# Patient Record
Sex: FEMALE | Race: WHITE | URBAN - METROPOLITAN AREA
[De-identification: names, ages, dates, MRNs, and addresses within clinical notes are randomized per-mention and may not be internally consistent; named-entity substitution may affect disease eponyms.]

---

## 2024-07-02 ENCOUNTER — HOSPITAL ENCOUNTER (OUTPATIENT)
Dept: PHYSICAL THERAPY | Age: 34
Setting detail: RECURRING SERIES
Discharge: HOME | End: 2024-07-05
Payer: MEDICAID

## 2024-07-02 DIAGNOSIS — M79.661 PAIN IN BOTH LOWER LEGS: ICD-10-CM

## 2024-07-02 DIAGNOSIS — I89.0 LYMPHEDEMA, NOT ELSEWHERE CLASSIFIED: ICD-10-CM

## 2024-07-02 DIAGNOSIS — R60.9 LIPEDEMA: Primary | ICD-10-CM

## 2024-07-02 DIAGNOSIS — M79.662 PAIN IN BOTH LOWER LEGS: ICD-10-CM

## 2024-07-02 PROCEDURE — 97166 OT EVAL MOD COMPLEX 45 MIN: CPT

## 2024-07-02 ASSESSMENT — PAIN SCALES - GENERAL: PAINLEVEL_OUTOF10: 2

## 2024-07-02 NOTE — PROGRESS NOTES
Jeferson Colvin  : 1990  Primary: [employer] Med[employer]* (Medicaid Managed)  Secondary:  [facility]  [address] [place] [address]  Phone: [phone]  Fax: [phone]    Plan of Care/Certification Expiration Date:  Certification Period Expiration Date: 24      Frequency/Duration: Plan Frequency: twice a week up to 90 days      Time In/Out:   Time In: 0815  Time Out: 0845    OT Visit Info:  Plan Frequency: twice a week up to 90 days  Total # of Visits Approved: 75  Total # of Visits to Date: 1       Visit Count: Visit count could not be calculated. Make sure you are using a visit which is associated with an episode.   OUTPATIENT OCCUPATIONAL THERAPY: Treatment Note 2024  Episode: (Lipedema)         Treatment Diagnosis:    Lipedema  Lymphedema, not elsewhere classified  Pain in both lower legs  Medical/Referring Diagnosis:   Localized edema   Referring Physician:  Jeferson Kidd PA-C MD Orders:  OT Eval and Treat   Return MD Appt:  TBD   Date of Onset:  Onset Date: 90     Allergies:  Patient has no allergy information on record.  Restrictions/Precautions:   None      Medications Last Reviewed:  2024     Interventions Planned (Treatment may consist of any combination of the following):     See Assessment Note      Subjective Comments:   \"Help mobility, reduce pain/inflammation\"   >Initial Pain Level:     2/10  >Post Session Pain Level:     2/10  Medications Last Reviewed:  2024  Updated Objective Findings:  See Evaluation Note from today  Treatment   MANUAL THERAPY: ( minutes):   Manual lymphatic drainage was utilized and necessary because of the patient's  lipolymphedema .   Manual Lymph Drainage:   Lymph Nodes:    Cervical Supraclavicular Axillary Abdominal Inguinal Popliteal Antecubital   RIGHT []     []     []     []     []     []     []       LEFT []     []     []     []     []     []     []

## 2024-07-02 NOTE — THERAPY EVALUATION
Jeferson Zunigacoat  : 1990  Primary: [employer] Healthplan Medica* (Medicaid Managed)  Secondary:  [facility]  [address] [place] [address]  Phone: [phone]  Fax: [phone] Plan Frequency: twice a week up to 90 days    Certification Period Expiration Date: 24        Plan of Care/Certification Expiration Date:  Certification Period Expiration Date: 24      Frequency/Duration: Plan Frequency: twice a week up to 90 days      Time In/Out:   Time In: 0815  Time Out: 0845    OT Visit Info:  Plan Frequency: twice a week up to 90 days  Total # of Visits Approved: 75  Total # of Visits to Date: 1       Visit Count: Visit count could not be calculated. Make sure you are using a visit which is associated with an episode.   OUTPATIENT OCCUPATIONAL THERAPY:Initial Assessment 2024  Episode: (Lipedema)           Treatment Diagnosis:    Lipedema  Lymphedema, not elsewhere classified  Pain in both lower legs  Medical/Referring Diagnosis:    Localized edema   Referring Physician:  Jeferson Kidd PA-C MD Orders:  OT Eval and Treat   Return MD Appt:  TBD  Date of Onset:  Onset Date: 90     Allergies:  Patient has no allergy information on record.  Restrictions/Precautions:    None      Medications Last Reviewed:  2024     SUBJECTIVE   History of Injury/Illness (Reason for Referral):  2  Patient Stated Goal(s):  \"Help mobility; reduce pain and inflammation\"  Initial Pain Assessment:     2/10    Post Session Pain Level:     2/10  Past Medical History/Comorbidities:   Ms. Colvin  has no past medical history on file.  Ms. Colvin  has no past surgical history on file.  Social History/Living Environment:   Patient lives with their family     Level of Function/Work/Activity:    Prior Level of Function: Independent         Learning:   Does the patient/guardian have any barriers to learning?: No barriers    Fall Risk Scale:   Gutierrez Total

## 2024-07-11 ENCOUNTER — HOSPITAL ENCOUNTER (OUTPATIENT)
Dept: PHYSICAL THERAPY | Age: 34
Setting detail: RECURRING SERIES
Discharge: HOME | End: 2024-07-14
Payer: MEDICAID

## 2024-07-11 DIAGNOSIS — R60.9 LYMPHEDEMA DUE TO LIPEDEMA: Primary | ICD-10-CM

## 2024-07-11 DIAGNOSIS — I89.0 LYMPHEDEMA DUE TO LIPEDEMA: Primary | ICD-10-CM

## 2024-07-11 PROCEDURE — 97535 SELF CARE MNGMENT TRAINING: CPT

## 2024-07-11 PROCEDURE — 97140 MANUAL THERAPY 1/> REGIONS: CPT

## 2024-07-11 NOTE — PROGRESS NOTES
RIGHT []     []     []     []       LEFT []     []     []     []         Limbs:   []    RUE     []    LUE     [x]    RLE    [x]    LLE    SELF CARE: ( 15 minutes):   Reduction wrap applied to BLE. Aquaphor for skin care. Cotton liner. Artiflex around ankles. 3 bandages each leg. Wear for 24 hours if able, remove and document results.       Treatment/Session Summary:    Treatment Assessment:   Pt in agreement with POC and goals   Communication/Consultation:  None today  Equipment provided today:  None  Recommendations/Intent for next treatment session: Next visit will focus on phase 1 complete decongestive therapy and patient education.    >Total Treatment Billable Duration:  OT Individual Minutes  Time In: 1430  Time Out: 1530  Minutes: 60    Leandro Alegre OT     Charge Capture   Events  [place] Portal  Appt Desk  Attendance Report     Future Appointments   Date Time Provider Department Center   7/18/2024  1:30 PM Leandro Alegre OT [facility]   7/22/2024 12:30 PM Leandro Alegre OT [facility]   7/25/2024 12:30 PM Leandro Alegre OT [facility]   7/30/2024 11:00 AM Leandro Alegre OT [facility]   8/1/2024  1:30 PM Leandro Alegre OT [facility]

## 2024-07-18 ENCOUNTER — HOSPITAL ENCOUNTER (OUTPATIENT)
Dept: PHYSICAL THERAPY | Age: 34
Setting detail: RECURRING SERIES
Discharge: HOME | End: 2024-07-21
Payer: MEDICAID

## 2024-07-18 PROCEDURE — 97140 MANUAL THERAPY 1/> REGIONS: CPT

## 2024-07-18 PROCEDURE — 97535 SELF CARE MNGMENT TRAINING: CPT

## 2024-07-18 NOTE — PROGRESS NOTES
Jeferson Colvin  : 1990  Primary: [employer] Healthplan Medica* (Medicaid Managed)  Secondary:  [facility] [place] @ [facility]  [address] [place] [address]  Phone: [phone]  Fax: [phone]    Plan of Care/Certification Expiration Date:  Certification Period Expiration Date: 24      Frequency/Duration: Plan Frequency: twice a week up to 90 days      Time In/Out:   Time In: 1330  Time Out: 1430    OT Visit Info:  Plan Frequency: twice a week up to 90 days  Total # of Visits Approved: 75  Total # of Visits to Date: 3          OUTPATIENT OCCUPATIONAL THERAPY: Treatment Note 2024  Episode: (Lipedema)         Treatment Diagnosis:    No data found  Medical/Referring Diagnosis:   Localized edema   Referring Physician:  Jeferson Kidd PA-C MD Orders:  OT Eval and Treat   Return MD Appt:  TBD   Date of Onset:  Onset Date: 90     Allergies:  Patient has no allergy information on record.  Restrictions/Precautions:   None      Medications Last Reviewed:  2024     Interventions Planned (Treatment may consist of any combination of the following):     See Assessment Note      Subjective Comments:   \"Help mobility, reduce pain/inflammation\"   >Initial Pain Level:      /10  >Post Session Pain Level:      /10  Medications Last Reviewed:  2024  Updated Objective Findings:  See Evaluation Note from today  Treatment   MANUAL THERAPY: ( 45 minutes):   Manual lymphatic drainage was utilized and necessary because of the patient's  lipolymphedema .   Manual Lymph Drainage:   Lymph Nodes:    Cervical Supraclavicular Axillary Abdominal Inguinal Popliteal Antecubital   RIGHT [x]     []     [x]     []     [x]     [x]     []       LEFT [x]     []     [x]     []     [x]     [x]     []         Anastamoses:   Axillo-axillary Inguino-inguinal Axillo-inguinal Inguino-axillary   ANTERIOR []     []     []     []       POSTERIOR []     []     []     []       RIGHT []

## 2024-07-22 ENCOUNTER — HOSPITAL ENCOUNTER (OUTPATIENT)
Dept: PHYSICAL THERAPY | Age: 34
Setting detail: RECURRING SERIES
Discharge: HOME | End: 2024-07-25
Payer: MEDICAID

## 2024-07-22 PROCEDURE — 97140 MANUAL THERAPY 1/> REGIONS: CPT

## 2024-07-22 PROCEDURE — 97535 SELF CARE MNGMENT TRAINING: CPT

## 2024-07-22 NOTE — PROGRESS NOTES
LEFT []     []     []     []         Limbs:   []    RUE     []    LUE     [x]    RLE    [x]    LLE    SELF CARE: ( 15 minutes):   Reduction wrap applied to BLE. Aquaphor for skin care. Cotton liner. Artiflex around ankles. 3 bandages each leg. .     Edema/Girth   PRETREATMENT AFFECTED LIMB(s): LOWER EXTREMITY       Date:   7/2/24           Right / Left              Groin    []      []              8 inches    []      []              4 inches    [x]      [x] 55.5/55  54  53       PoplitealSpace    [x]      [x] 48/47.5  43  45          8 inches    [x]      [x] 53/54  52  53          4 inches    [x]      [x] 38/41  42  41       30 down    [x]      [x]  31  30.5        Ankle    [x]      [x] 28/27  26  26.5          Instep    [x]      [x] 23.5/23.5  23.5  22       Measurements are taken in centimeters:  2.54 cm = 1 inch   Cumulative circumferential volumetric graph measurements  RLE total size 236 cm         LLE total size 248 cm                Treatment/Session Summary:    Treatment Assessment:   Really good initial reductions, now bilaterally bandaging from foot to inferior knee.   Need to schedule past 8/1/24 (patient did after session).   Did comfort Care MD benefit check 7/18/24 - not in network. Reached out to [facility] for benefit check on 7/24/24.   Consider Rejuva leggings, Sigvaris leggings, or Juzo leggings.   Communication/Consultation:  None today  Equipment provided today:  None  Recommendations/Intent for next treatment session: Next visit will focus on phase 1 complete decongestive therapy and patient education.    >Total Treatment Billable Duration:       Leandro Alegre OT     Charge Capture   Events  [employer] Portal  Appt Desk  Attendance Report     Future Appointments   Date Time Provider Department Center   7/25/2024 12:30 PM Leandro Alegre OT [facility]   7/30/2024 11:00 AM Leandro Alegre OT [facility]   8/1/2024  1:30 PM Leandro Alegre OT [facility]   8/7/2024 12:30 PM Chely Rogers

## 2024-07-25 ENCOUNTER — HOSPITAL ENCOUNTER (OUTPATIENT)
Dept: PHYSICAL THERAPY | Age: 34
Setting detail: RECURRING SERIES
Discharge: HOME | End: 2024-07-28
Payer: MEDICAID

## 2024-07-25 DIAGNOSIS — I89.0 LYMPHEDEMA: Primary | ICD-10-CM

## 2024-07-25 PROCEDURE — 97140 MANUAL THERAPY 1/> REGIONS: CPT

## 2024-07-25 PROCEDURE — 97535 SELF CARE MNGMENT TRAINING: CPT

## 2024-07-26 NOTE — PROGRESS NOTES
Jeferson Colvin  : 1990  Primary: [employer] Healthplan Medica* (Medicaid Managed)  Secondary:  [place] [place] @ [facility]  [address] [place] [address]  Phone: [phone]  Fax: [phone]    Plan of Care/Certification Expiration Date:  Certification Period Expiration Date: 24      Frequency/Duration: Plan Frequency: twice a week up to 90 days      Time In/Out:   Time In: 1230  Time Out: 1330    OT Visit Info:  Plan Frequency: twice a week up to 90 days  Total # of Visits Approved: 75  Total # of Visits to Date: 5          OUTPATIENT OCCUPATIONAL THERAPY: Treatment Note 2024  Episode: (Lipedema)         Treatment Diagnosis:    Lymphedema  Medical/Referring Diagnosis:   Localized edema   Referring Physician:  Jeferson Kidd PA-C MD Orders:  OT Eval and Treat   Return MD Appt:  TBD   Date of Onset:  Onset Date: 90     Allergies:  Patient has no allergy information on record.  Restrictions/Precautions:   None      Medications Last Reviewed:  2024     Interventions Planned (Treatment may consist of any combination of the following):     See Assessment Note      Subjective Comments:   \"Help mobility, reduce pain/inflammation\"   >Initial Pain Level:      /10  >Post Session Pain Level:      /10  Medications Last Reviewed:  2024  Updated Objective Findings:  None Today  Treatment   MANUAL THERAPY: ( 45 minutes):   Manual lymphatic drainage was utilized and necessary because of the patient's  lipolymphedema .   Manual Lymph Drainage:   Lymph Nodes:    Cervical Supraclavicular Axillary Abdominal Inguinal Popliteal Antecubital   RIGHT [x]     []     [x]     []     [x]     [x]     []       LEFT [x]     []     [x]     []     [x]     [x]     []         Anastamoses:   Axillo-axillary Inguino-inguinal Axillo-inguinal Inguino-axillary   ANTERIOR []     []     []     []       POSTERIOR []     []     []     []       RIGHT []     []     []     []

## 2024-07-30 ENCOUNTER — APPOINTMENT (OUTPATIENT)
Dept: PHYSICAL THERAPY | Age: 34
End: 2024-07-30
Payer: MEDICAID

## 2024-08-01 ENCOUNTER — HOSPITAL ENCOUNTER (OUTPATIENT)
Dept: PHYSICAL THERAPY | Age: 34
Setting detail: RECURRING SERIES
Discharge: HOME | End: 2024-08-04
Payer: MEDICAID

## 2024-08-01 PROCEDURE — 97535 SELF CARE MNGMENT TRAINING: CPT

## 2024-08-01 PROCEDURE — 97140 MANUAL THERAPY 1/> REGIONS: CPT

## 2024-08-03 NOTE — PROGRESS NOTES
LEFT []     []     []     []         Limbs:   []    RUE     []    LUE     [x]    RLE    [x]    LLE    SELF CARE: ( 25 minutes):   Reduction wrap applied to BLE. Aquaphor for skin care. Cotton liner. Artiflex around ankles. 3 bandages each leg. Patient continues to have good results.     Edema/Girth   PRETREATMENT AFFECTED LIMB(s): LOWER EXTREMITY       Date:   7/2/24 7/25          Right / Left              Groin    []      []              8 inches    []      []              4 inches    [x]      [x] 55.5/55  54  53       PoplitealSpace    [x]      [x] 48/47.5  43  45          8 inches    [x]      [x] 53/54  52  53          4 inches    [x]      [x] 38/41  42  41       30 down    [x]      [x]  31  30.5        Ankle    [x]      [x] 28/27  26  26.5          Instep    [x]      [x] 23.5/23.5  23.5  22       Measurements are taken in centimeters:  2.54 cm = 1 inch   Cumulative circumferential volumetric graph measurements  RLE total size 236 cm         LLE total size 248 cm              Treatment/Session Summary:    Treatment Assessment:   Bilaterally bandaging from foot to inferior knee. Patient noticed after missing Tuesday appointment yesterday her fluid was larger than she would have wished.       Need to schedule past 8/1/24 (patient did after session).   Did comfort Care MD benefit check 7/18/24 - not in network. Reached out to [facility] for benefit check on 7/24/24.   Consider Rejuva leggings, Sigvaris leggings, or Juzo leggings.   Communication/Consultation:  None today  Equipment provided today:  None  Recommendations/Intent for next treatment session: Next visit will focus on phase 1 complete decongestive therapy and patient education.    >Total Treatment Billable Duration:  OT Individual Minutes  Time In: 1330  Time Out: 1430  Minutes: 60    Leandro Alegre OT     Charge Capture   Events  [employer] Portal  Appt Desk  Attendance Report     Future Appointments   Date Time Provider Department Center   8/7/2024

## 2024-08-07 ENCOUNTER — HOSPITAL ENCOUNTER (OUTPATIENT)
Dept: PHYSICAL THERAPY | Age: 34
Setting detail: RECURRING SERIES
Discharge: HOME | End: 2024-08-10
Payer: MEDICAID

## 2024-08-07 PROCEDURE — 97140 MANUAL THERAPY 1/> REGIONS: CPT

## 2024-08-07 PROCEDURE — 97535 SELF CARE MNGMENT TRAINING: CPT

## 2024-08-07 ASSESSMENT — PAIN SCALES - GENERAL: PAINLEVEL_OUTOF10: 2

## 2024-08-07 NOTE — PROGRESS NOTES
Jeferson Colvin  : 1990  Primary: [employer] Health[employer] Med[employer]* (Medicaid Managed)  Secondary:  [place] [facility] [place] @ [facility]  [address] [place] [address]  Phone: [phone]  Fax: [phone]    Plan of Care/Certification Expiration Date:  Certification Period Expiration Date: 24      Frequency/Duration: Plan Frequency: twice a week up to 90 days      Time In/Out:   Time In: 1230  Time Out: 1330    OT Visit Info:  Plan Frequency: twice a week up to 90 days  Total # of Visits Approved: 75  Total # of Visits to Date: 7          OUTPATIENT OCCUPATIONAL THERAPY: Treatment Note 2024  Episode: (Lipedema)         Treatment Diagnosis:    Lymphedema  Medical/Referring Diagnosis:   Localized edema   Referring Physician:  Jeferson Kidd PA-C MD Orders:  OT Eval and Treat   Return MD Appt:  TBD   Date of Onset:  Onset Date: 90     Allergies:  Patient has no allergy information on record.  Restrictions/Precautions:   None      Medications Last Reviewed:  2024     Interventions Planned (Treatment may consist of any combination of the following):     See Assessment Note      Subjective Comments:   \"My legs feel better when they are bandaged\"  >Initial Pain Level:     2/10  >Post Session Pain Level:     1/10  Medications Last Reviewed:  2024  Updated Objective Findings:  None Today  Treatment   MANUAL THERAPY: ( 40minutes):   Manual lymphatic drainage was utilized and necessary because of the patient's  lipolymphedema .   Manual Lymph Drainage:   Lymph Nodes:    Cervical Supraclavicular Axillary Abdominal Inguinal Popliteal Antecubital   RIGHT [x]     [x]     [x]     []     [x]     [x]     []       LEFT [x]     [x]     [x]     []     [x]     [x]     []         Anastamoses:   Axillo-axillary Inguino-inguinal Axillo-inguinal Inguino-axillary   ANTERIOR []     []     []     []       POSTERIOR []     []     []     []       RIGHT []     []     []     []

## 2024-08-12 ENCOUNTER — HOSPITAL ENCOUNTER (OUTPATIENT)
Dept: PHYSICAL THERAPY | Age: 34
Setting detail: RECURRING SERIES
Discharge: HOME | End: 2024-08-15
Payer: MEDICAID

## 2024-08-12 PROCEDURE — 97535 SELF CARE MNGMENT TRAINING: CPT

## 2024-08-12 PROCEDURE — 97140 MANUAL THERAPY 1/> REGIONS: CPT

## 2024-08-12 NOTE — PROGRESS NOTES
Jeferson Colvin  : 1990  Primary: [employer] Medica* (Medicaid Managed)  Secondary:  [place] [facility] [place] @ [facility]  [address] [place] [address]  Phone: [phone]  Fax: [phone]    Plan of Care/Certification Expiration Date:  Certification Period Expiration Date: 24      Frequency/Duration: Plan Frequency: twice a week up to 90 days      Time In/Out:   Time In: 0812  Time Out: 0900    OT Visit Info:  Plan Frequency: twice a week up to 90 days  Total # of Visits Approved: 75  Total # of Visits to Date: 8          OUTPATIENT OCCUPATIONAL THERAPY: Treatment Note 2024  Episode: (Lipedema)         Treatment Diagnosis:    Lymphedema  Medical/Referring Diagnosis:   Localized edema   Referring Physician:  Jeferson Kidd PA-C MD Orders:  OT Eval and Treat   Return MD Appt:  TBD   Date of Onset:  Onset Date: 90     Allergies:  Patient has no allergy information on record.  Restrictions/Precautions:   None      Medications Last Reviewed:  2024     Interventions Planned (Treatment may consist of any combination of the following):     See Assessment Note      Subjective Comments:   \"My legs feel better when they are bandaged\"  >Initial Pain Level:      /10  >Post Session Pain Level:      /10  Medications Last Reviewed:  2024  Updated Objective Findings:  None Today  Treatment   MANUAL THERAPY: ( 30 minutes):   Manual lymphatic drainage was utilized and necessary because of the patient's  lipolymphedema .   Manual Lymph Drainage:   Lymph Nodes:    Cervical Supraclavicular Axillary Abdominal Inguinal Popliteal Antecubital   RIGHT [x]     [x]     [x]     []     [x]     [x]     []       LEFT [x]     [x]     [x]     []     [x]     [x]     []         Anastamoses:   Axillo-axillary Inguino-inguinal Axillo-inguinal Inguino-axillary   ANTERIOR []     []     []     []       POSTERIOR []     []     []     []       RIGHT []     []     []

## 2024-08-14 ENCOUNTER — HOSPITAL ENCOUNTER (OUTPATIENT)
Dept: PHYSICAL THERAPY | Age: 34
Setting detail: RECURRING SERIES
Discharge: HOME | End: 2024-08-17
Payer: MEDICAID

## 2024-08-14 PROCEDURE — 97535 SELF CARE MNGMENT TRAINING: CPT

## 2024-08-14 PROCEDURE — 97140 MANUAL THERAPY 1/> REGIONS: CPT

## 2024-08-16 NOTE — PROGRESS NOTES
8/19/2024  9:00 AM Leandro Alegre, OT [facility]   8/21/2024 10:00 AM Leandro Alegre, OT [facility]   8/26/2024  9:00 AM Leandro Alegre, OT [facility]   9/4/2024  9:00 AM Leandro Alegre, OT [facility]   9/6/2024  8:00 AM Marta Gutierrez, OT [facility]   9/10/2024 10:00 AM Leandro Alegre, OT [facility]   9/12/2024  8:00 AM Leandro Alegre, OT [facility]   9/16/2024  9:00 AM Leandro Alegre, OT [facility]   9/19/2024  9:00 AM Leandro Alegre, OT [facility]   9/23/2024  9:00 AM Leandro Alegre, OT [facility]   9/26/2024  9:00 AM Leandro Alegre, OT [facility]   9/30/2024  9:00 AM Leandro Alegre, OT [facility]   10/2/2024  9:00 AM Leandro Alegre, OT [facility]

## 2024-08-19 ENCOUNTER — HOSPITAL ENCOUNTER (OUTPATIENT)
Dept: PHYSICAL THERAPY | Age: 34
Setting detail: RECURRING SERIES
Discharge: HOME | End: 2024-08-22
Payer: MEDICAID

## 2024-08-19 PROCEDURE — 97535 SELF CARE MNGMENT TRAINING: CPT

## 2024-08-19 PROCEDURE — 97140 MANUAL THERAPY 1/> REGIONS: CPT

## 2024-08-19 NOTE — PROGRESS NOTES
Jeferson Colvin  : 1990  Primary: [email or web address] Health[employer] Med[employer]* (Medicaid Managed)  Secondary:  [place] [facility] [place] @ [facility]  [address] [place] [address]  Phone: [phone]  Fax: [phone]    Plan of Care/Certification Expiration Date:  Certification Period Expiration Date: 24      Frequency/Duration: Plan Frequency: twice a week up to 90 days      Time In/Out:   Time In: 0900  Time Out: 1000    OT Visit Info:  Plan Frequency: twice a week up to 90 days  Total # of Visits Approved: 10  Total # of Visits to Date: 1          OUTPATIENT OCCUPATIONAL THERAPY: Treatment Note 2024  Episode: (Lipedema)         Treatment Diagnosis:    Lymphedema  Medical/Referring Diagnosis:   Localized edema   Referring Physician:  Jeferson Kidd PA-C MD Orders:  OT Eval and Treat   Return MD Appt:  TBD   Date of Onset:  Onset Date: 90     Allergies:  Patient has no allergy information on record.  Restrictions/Precautions:   None      Medications Last Reviewed:  2024     Interventions Planned (Treatment may consist of any combination of the following):     See Assessment Note      Subjective Comments:   \"My legs feel better when they are bandaged\"  >Initial Pain Level:      /10  >Post Session Pain Level:      /10  Medications Last Reviewed:  2024  Updated Objective Findings:  None Today  Treatment   MANUAL THERAPY: ( 40 minutes):   Manual lymphatic drainage was utilized and necessary because of the patient's  lipolymphedema .   Manual Lymph Drainage:   Lymph Nodes:    Cervical Supraclavicular Axillary Abdominal Inguinal Popliteal Antecubital   RIGHT [x]     [x]     [x]     []     [x]     [x]     []       LEFT [x]     [x]     [x]     []     [x]     [x]     []         Anastamoses:   Axillo-axillary Inguino-inguinal Axillo-inguinal Inguino-axillary   ANTERIOR []     []     []     []       POSTERIOR []     []     []     []       RIGHT []     []     []

## 2024-08-21 ENCOUNTER — HOSPITAL ENCOUNTER (OUTPATIENT)
Dept: PHYSICAL THERAPY | Age: 34
Setting detail: RECURRING SERIES
Discharge: HOME | End: 2024-08-24
Payer: MEDICAID

## 2024-08-21 PROCEDURE — 97140 MANUAL THERAPY 1/> REGIONS: CPT

## 2024-08-21 PROCEDURE — 97535 SELF CARE MNGMENT TRAINING: CPT

## 2024-08-21 NOTE — PROGRESS NOTES
patient/caregiver will be independent with home management of lymphedema/lipedema.   -progressing 8/19/2024  3. Patient/caregiver will be independent donning and doffing bilateral lower extremity compression garment.-GOAL MET 8/19/2024      4. Patient will report reduced pain and tenderness with light touch bilateral lower extremities. -progressing 8/19/2024    Progress note 8/21/2024: Patient making progress towards goals, she sees and feels improvements specifically in her calves and ankles. Down significantly in chart above. Continue tx plan.     Treatment/Session Summary:    Treatment Assessment: Patient tolerated treatment. Continue tx plan. Patient gets improved compression later this date.     Consider Rejuva leggings, Sigvaris leggings, or Juzo leggings.   Communication/Consultation:  None today  Equipment provided today:  None  Recommendations/Intent for next treatment session: Next visit will focus on phase 1 complete decongestive therapy and patient education.    >Total Treatment Billable Duration:  OT Individual Minutes  Time In: 1000  Time Out: 1100  Minutes: 60    Leandro Alegre OT     Charge Capture   Events  [employer] Portal  Appt Desk  Attendance Report     Future Appointments   Date Time Provider Department Center   8/26/2024  9:00 AM Leandro Alegre, OT [facility]   9/4/2024  9:00 AM Leandro Alegre, OT [facility]   9/6/2024  8:00 AM Marta Gutierrez, OT [facility]   9/10/2024 10:00 AM Leandro Alegre, OT [facility]   9/12/2024  8:00 AM Leandro Alegre, OT [facility]   9/16/2024  9:00 AM Leandro Alegre, OT [facility]   9/19/2024  9:00 AM Leandro Alegre, OT [facility]   9/23/2024  9:00 AM Leandro Alegre, OT [facility]   9/26/2024  9:00 AM Leandro Alegre, OT [facility]   9/30/2024  9:00 AM Leandro Alegre, OT [facility]   10/2/2024  9:00 AM Leandro Alegre, OT [facility]

## 2024-08-26 ENCOUNTER — HOSPITAL ENCOUNTER (OUTPATIENT)
Dept: PHYSICAL THERAPY | Age: 34
Setting detail: RECURRING SERIES
Discharge: HOME | End: 2024-08-29
Payer: MEDICAID

## 2024-08-26 PROCEDURE — 97535 SELF CARE MNGMENT TRAINING: CPT

## 2024-08-26 PROCEDURE — 97140 MANUAL THERAPY 1/> REGIONS: CPT

## 2024-08-26 NOTE — PROGRESS NOTES
Jeferson Colvin  : 1990  Primary: [employer] [facility] [employer]* (Medicaid Managed)  Secondary:  [facility] @ [facility]  [address] [place] [address]  Phone: [phone]  Fax: [phone]    Plan of Care/Certification Expiration Date:  Certification Period Expiration Date: 24      Frequency/Duration: Plan Frequency: twice a week up to 90 days      Time In/Out:   Time In: 0900  Time Out: 1000    OT Visit Info:  Plan Frequency: twice a week up to 90 days  Total # of Visits Approved: 11  Total # of Visits to Date: 2          OUTPATIENT OCCUPATIONAL THERAPY: Treatment Note 2024  Episode: (Lipedema)         Treatment Diagnosis:    Lymphedema  Medical/Referring Diagnosis:   Localized edema   Referring Physician:  Jeferson Kidd PA-C MD Orders:  OT Eval and Treat   Return MD Appt:  TBD   Date of Onset:  Onset Date: 90     Allergies:  Patient has no allergy information on record.  Restrictions/Precautions:   None      Medications Last Reviewed:  2024     Interventions Planned (Treatment may consist of any combination of the following):     See Assessment Note      Subjective Comments:   \"My legs feel better when they are bandaged\"  >Initial Pain Level:      /10  >Post Session Pain Level:      /10  Medications Last Reviewed:  2024  Updated Objective Findings:  None Today  Treatment   MANUAL THERAPY: ( 40 minutes):   Manual lymphatic drainage was utilized and necessary because of the patient's  lipolymphedema .   Manual Lymph Drainage:   Lymph Nodes:    Cervical Supraclavicular Axillary Abdominal Inguinal Popliteal Antecubital   RIGHT [x]     [x]     [x]     []     [x]     [x]     []       LEFT [x]     [x]     [x]     []     [x]     [x]     []         Anastamoses:   Axillo-axillary Inguino-inguinal Axillo-inguinal Inguino-axillary   ANTERIOR []     []     []     []       POSTERIOR []     []     []     []       RIGHT []     []     []      []       LEFT []     []     []     []         Limbs:   []    RUE     []    LUE     [x]    RLE    [x]    LLE    SELF CARE: ( 20 minutes):   After skin care with [employer], a multi layered compression bandage was applied to bilateral LE's from foot to knee using cotton stockinet, rosidal foam and 3 short stretch bandages per leg. She was instructed on exercises, elevation and to remove bandages if any medical complications.     Edema/Girth   PRETREATMENT AFFECTED LIMB(s): LOWER EXTREMITY       Date:   7/2/24 7/25          Right / Left              Groin    []      []              8 inches    []      []              4 inches    [x]      [x] 55.5/55  54  53       PoplitealSpace    [x]      [x] 48/47.5  43  45          8 inches    [x]      [x] 53/54  52  53          4 inches    [x]      [x] 38/41  42  41       30 down    [x]      [x]  31  30.5        Ankle    [x]      [x] 28/27  26  26.5          Instep    [x]      [x] 23.5/23.5  23.5  22       Measurements are taken in centimeters:  2.54 cm = 1 inch   Cumulative circumferential volumetric graph measurements  RLE total size 236 cm  219.5       LLE total size 248 cm  240.5            Goals: (Goals have been discussed and agreed upon with patient.)  Short-Term Functional Goals: Time Frame: 45 days  1. The patient/caregiver will verbalize understanding of lymphedema precautions. -GOAL MET 8/19/2024   2. Patient will be independent with skin care regimen to decrease risk of cellulitis. . -GOAL MET 8/19/2024   3. The patient/caregiver will be independent with self-manual lymph drainage techniques and show decrease in limb volume.  -progressing 8/19/2024  4. Patient will be independent in lymphatic exercises.    -progressing 8/19/2024  Discharge Goals: Time Frame: 45 days  1. Patient's bilateral lower extremity circumferential measurements will decrease 2 cm or more on volumetric graph to maximize functional use in ADL's.  -GOAL MET 8/19/2024 progress to 8+cms   2. The

## 2024-08-28 ENCOUNTER — APPOINTMENT (OUTPATIENT)
Dept: PHYSICAL THERAPY | Age: 34
End: 2024-08-28
Payer: MEDICAID

## 2024-09-04 ENCOUNTER — HOSPITAL ENCOUNTER (OUTPATIENT)
Dept: PHYSICAL THERAPY | Age: 34
Setting detail: RECURRING SERIES
Discharge: HOME | End: 2024-09-07
Payer: MEDICAID

## 2024-09-04 PROCEDURE — 97535 SELF CARE MNGMENT TRAINING: CPT

## 2024-09-04 PROCEDURE — 97140 MANUAL THERAPY 1/> REGIONS: CPT

## 2024-09-04 NOTE — PROGRESS NOTES
Jeferson Colvin  : 1990  Primary: [employer]* (Medicaid Managed)  Secondary:  [facility] @ [facility]  [address] [place] [address]  Phone: [phone]  Fax: [phone]    Plan of Care/Certification Expiration Date:  Certification Period Expiration Date: 24      Frequency/Duration: Plan Frequency: twice a week up to 90 days      Time In/Out:        OT Visit Info:  Plan Frequency: twice a week up to 90 days  Total # of Visits Approved: 11  Total # of Visits to Date: 2          OUTPATIENT OCCUPATIONAL THERAPY: Treatment Note 2024  Episode: (Lipedema)         Treatment Diagnosis:    Lymphedema  Medical/Referring Diagnosis:   Localized edema   Referring Physician:  Jeferson Kidd PA-C MD Orders:  OT Eval and Treat   Return MD Appt:  TBD   Date of Onset:  Onset Date: 90     Allergies:  Patient has no allergy information on record.  Restrictions/Precautions:   None      Medications Last Reviewed:  2024     Interventions Planned (Treatment may consist of any combination of the following):     See Assessment Note      Subjective Comments:   \"My legs feel better when they are bandaged\"  >Initial Pain Level:      /10  >Post Session Pain Level:      /10  Medications Last Reviewed:  2024  Updated Objective Findings:  None Today  Treatment   MANUAL THERAPY: ( 40 minutes):   Manual lymphatic drainage was utilized and necessary because of the patient's  lipolymphedema .   Manual Lymph Drainage:   Lymph Nodes:    Cervical Supraclavicular Axillary Abdominal Inguinal Popliteal Antecubital   RIGHT [x]     [x]     [x]     []     [x]     [x]     []       LEFT [x]     [x]     [x]     []     [x]     [x]     []         Anastamoses:   Axillo-axillary Inguino-inguinal Axillo-inguinal Inguino-axillary   ANTERIOR []     []     []     []       POSTERIOR []     []     []     []       RIGHT []     []     []     []       LEFT []     []     []

## 2024-09-06 ENCOUNTER — HOSPITAL ENCOUNTER (OUTPATIENT)
Dept: PHYSICAL THERAPY | Age: 34
Setting detail: RECURRING SERIES
Discharge: HOME | End: 2024-09-09
Payer: MEDICAID

## 2024-09-06 PROCEDURE — 97535 SELF CARE MNGMENT TRAINING: CPT

## 2024-09-06 PROCEDURE — 97140 MANUAL THERAPY 1/> REGIONS: CPT

## 2024-09-06 ASSESSMENT — PAIN SCALES - GENERAL: PAINLEVEL_OUTOF10: 2

## 2024-09-10 ENCOUNTER — HOSPITAL ENCOUNTER (OUTPATIENT)
Dept: PHYSICAL THERAPY | Age: 34
Setting detail: RECURRING SERIES
Discharge: HOME | End: 2024-09-13
Payer: MEDICAID

## 2024-09-10 PROCEDURE — 97535 SELF CARE MNGMENT TRAINING: CPT

## 2024-09-10 PROCEDURE — 97140 MANUAL THERAPY 1/> REGIONS: CPT

## 2024-09-12 ENCOUNTER — HOSPITAL ENCOUNTER (OUTPATIENT)
Dept: PHYSICAL THERAPY | Age: 34
Setting detail: RECURRING SERIES
Discharge: HOME | End: 2024-09-15
Payer: MEDICAID

## 2024-09-12 PROCEDURE — 97535 SELF CARE MNGMENT TRAINING: CPT

## 2024-09-12 PROCEDURE — 97140 MANUAL THERAPY 1/> REGIONS: CPT

## 2024-09-16 ENCOUNTER — HOSPITAL ENCOUNTER (OUTPATIENT)
Dept: PHYSICAL THERAPY | Age: 34
Setting detail: RECURRING SERIES
Discharge: HOME | End: 2024-09-19
Payer: MEDICAID

## 2024-09-16 PROCEDURE — 97535 SELF CARE MNGMENT TRAINING: CPT

## 2024-09-16 PROCEDURE — 97140 MANUAL THERAPY 1/> REGIONS: CPT

## 2024-09-19 ENCOUNTER — HOSPITAL ENCOUNTER (OUTPATIENT)
Dept: PHYSICAL THERAPY | Age: 34
Setting detail: RECURRING SERIES
Discharge: HOME | End: 2024-09-22
Payer: MEDICAID

## 2024-09-19 PROCEDURE — 97535 SELF CARE MNGMENT TRAINING: CPT

## 2024-09-19 PROCEDURE — 97140 MANUAL THERAPY 1/> REGIONS: CPT

## 2024-09-23 ENCOUNTER — HOSPITAL ENCOUNTER (OUTPATIENT)
Dept: PHYSICAL THERAPY | Age: 34
Setting detail: RECURRING SERIES
Discharge: HOME | End: 2024-09-26
Payer: MEDICAID

## 2024-09-23 PROCEDURE — 97535 SELF CARE MNGMENT TRAINING: CPT

## 2024-09-23 PROCEDURE — 97140 MANUAL THERAPY 1/> REGIONS: CPT

## 2024-09-26 ENCOUNTER — HOSPITAL ENCOUNTER (OUTPATIENT)
Dept: PHYSICAL THERAPY | Age: 34
Setting detail: RECURRING SERIES
Discharge: HOME | End: 2024-09-29
Payer: MEDICAID

## 2024-09-26 PROCEDURE — 97140 MANUAL THERAPY 1/> REGIONS: CPT

## 2024-09-26 PROCEDURE — 97535 SELF CARE MNGMENT TRAINING: CPT

## 2024-09-26 NOTE — THERAPY RECERTIFICATION
Jeferson Colvin  : 1990  Primary: [employer]* (Medicaid Managed)  Secondary:  [facility] @ [facility]  [address] [place] [address]  Phone: [phone]  Fax: [phone]    Plan of Care/Certification Expiration Date:  Certification Period Expiration Date: 24      Frequency/Duration: Plan Frequency: twice a week up to 90 days      Time In/Out:        OT Visit Info:  Plan Frequency: twice a week up to 90 days  Total # of Visits Approved: 75  Total # of Visits to Date:   Progress Note Counter: 9          OUTPATIENT OCCUPATIONAL THERAPY: Treatment Note 2024  Episode: (Lipedema)         Treatment Diagnosis:    Lymphedema  Medical/Referring Diagnosis:   Localized edema   Referring Physician:  Jeferson Kidd PA-C MD Orders:  OT Eval and Treat   Return MD Appt:  TBD   Date of Onset:  Onset Date: 90     Allergies:  Patient has no allergy information on record.  Restrictions/Precautions:   None      Medications Last Reviewed:  2024     Interventions Planned (Treatment may consist of any combination of the following):     See Assessment Note      Subjective Comments:   \"I enjoyed the large pump trial more that I thought I would (from [employer]).\"  >Initial Pain Level:      2/10  >Post Session Pain Level:      2/10  Medications Last Reviewed:  2024  Updated Objective Findings:  None Today  Treatment   MANUAL THERAPY: ( 45 minutes):   Manual lymphatic drainage was utilized and necessary because of the patient's  lipolymphedema .   Manual Lymph Drainage:   Lymph Nodes:    Cervical Supraclavicular Axillary Abdominal Inguinal Popliteal Antecubital   RIGHT [x]     [x]     [x]     []     [x]     [x]     []       LEFT [x]     [x]     [x]     []     [x]     [x]     []         Anastamoses:   Axillo-axillary Inguino-inguinal Axillo-inguinal Inguino-axillary   ANTERIOR []     []     []     []       POSTERIOR []     []     []

## 2024-09-30 ENCOUNTER — APPOINTMENT (OUTPATIENT)
Dept: PHYSICAL THERAPY | Age: 34
End: 2024-09-30
Payer: MEDICAID

## 2024-10-02 ENCOUNTER — HOSPITAL ENCOUNTER (OUTPATIENT)
Dept: PHYSICAL THERAPY | Age: 34
Setting detail: RECURRING SERIES
End: 2024-10-02
Payer: MEDICAID

## 2024-10-07 ENCOUNTER — APPOINTMENT (OUTPATIENT)
Dept: PHYSICAL THERAPY | Age: 34
End: 2024-10-07
Payer: MEDICAID

## 2024-10-09 ENCOUNTER — HOSPITAL ENCOUNTER (OUTPATIENT)
Dept: PHYSICAL THERAPY | Age: 34
Setting detail: RECURRING SERIES
Discharge: HOME | End: 2024-10-12
Payer: MEDICAID

## 2024-10-09 PROCEDURE — 97140 MANUAL THERAPY 1/> REGIONS: CPT

## 2024-10-09 NOTE — PROGRESS NOTES
RIGHT []     []     []     []       LEFT []     []     []     []         Limbs:   []    RUE     []    LUE     [x]    RLE    [x]    LLE    SELF CARE: (  minutes):   Medi 30-40mmHg compression knee highs applied after MLD. She bandages at night.     Edema/Girth   PRETREATMENT AFFECTED LIMB(s): LOWER EXTREMITY       Date:   7/2/24 7/25          Right / Left              Groin    []      []              8 inches    []      []              4 inches    [x]      [x] 55.5/55  54  53       PoplitealSpace    [x]      [x] 48/47.5  43  45          8 inches    [x]      [x] 53/54  52  53          4 inches    [x]      [x] 38/41  42  41       30 down    [x]      [x]  31  30.5        Ankle    [x]      [x] 28/27  26  26.5          Instep    [x]      [x] 23.5/23.5  23.5  22       Measurements are taken in centimeters:  2.54 cm = 1 inch   Cumulative circumferential volumetric graph measurements  RLE total size 236 cm  219.5       LLE total size 248 cm  240.5            Goals: (Goals have been discussed and agreed upon with patient.)  Short-Term Functional Goals: Time Frame: 45 days  1. The patient/caregiver will verbalize understanding of lymphedema precautions. -GOAL MET 8/19/2024   2. Patient will be independent with skin care regimen to decrease risk of cellulitis. . -GOAL MET 8/19/2024   3. The patient/caregiver will be independent with self-manual lymph drainage techniques and show decrease in limb volume.  -progressing 8/19/2024  4. Patient will be independent in lymphatic exercises.    -progressing 8/19/2024  Discharge Goals: Time Frame: 45 days  1. Patient's bilateral lower extremity circumferential measurements will decrease 2 cm or more on volumetric graph to maximize functional use in ADL's.  -GOAL MET 8/19/2024 progress to 8+cms   2. The patient/caregiver will be independent with home management of lymphedema/lipedema.   -progressing 8/19/2024, 9/26/24  3. Patient/caregiver will be independent donning and doffing

## 2024-10-17 ENCOUNTER — HOSPITAL ENCOUNTER (OUTPATIENT)
Dept: PHYSICAL THERAPY | Age: 34
Setting detail: RECURRING SERIES
Discharge: HOME | End: 2024-10-20
Payer: MEDICAID

## 2024-10-17 PROCEDURE — 97535 SELF CARE MNGMENT TRAINING: CPT

## 2024-10-17 PROCEDURE — 97140 MANUAL THERAPY 1/> REGIONS: CPT

## 2024-10-17 NOTE — PROGRESS NOTES
RIGHT []     []     []     []       LEFT []     []     []     []         Limbs:   []    RUE     []    LUE     [x]    RLE    [x]    LLE    SELF CARE: (  20 minutes):   Reduction wrap using cotton liner. 3 bandages and rosidal foam this date.     Edema/Girth   PRETREATMENT AFFECTED LIMB(s): LOWER EXTREMITY       Date:   7/2/24 7/25          Right / Left              Groin    []      []              8 inches    []      []              4 inches    [x]      [x] 55.5/55  54  53       PoplitealSpace    [x]      [x] 48/47.5  43  45          8 inches    [x]      [x] 53/54  52  53          4 inches    [x]      [x] 38/41  42  41       30 down    [x]      [x]  31  30.5        Ankle    [x]      [x] 28/27  26  26.5          Instep    [x]      [x] 23.5/23.5  23.5  22       Measurements are taken in centimeters:  2.54 cm = 1 inch   Cumulative circumferential volumetric graph measurements  RLE total size 236 cm  219.5       LLE total size 248 cm  240.5            Goals: (Goals have been discussed and agreed upon with patient.)  Short-Term Functional Goals: Time Frame: 45 days  1. The patient/caregiver will verbalize understanding of lymphedema precautions. -GOAL MET 8/19/2024   2. Patient will be independent with skin care regimen to decrease risk of cellulitis. . -GOAL MET 8/19/2024   3. The patient/caregiver will be independent with self-manual lymph drainage techniques and show decrease in limb volume.  -progressing 8/19/2024  4. Patient will be independent in lymphatic exercises.    -progressing 8/19/2024  Discharge Goals: Time Frame: 45 days  1. Patient's bilateral lower extremity circumferential measurements will decrease 2 cm or more on volumetric graph to maximize functional use in ADL's.  -GOAL MET 8/19/2024 progress to 8+cms   2. The patient/caregiver will be independent with home management of lymphedema/lipedema.   -progressing 8/19/2024, 9/26/24  3. Patient/caregiver will be independent donning and doffing bilateral

## 2024-10-23 ENCOUNTER — APPOINTMENT (OUTPATIENT)
Dept: PHYSICAL THERAPY | Age: 34
End: 2024-10-23
Payer: MEDICAID

## 2024-10-25 ENCOUNTER — HOSPITAL ENCOUNTER (OUTPATIENT)
Dept: PHYSICAL THERAPY | Age: 34
Setting detail: RECURRING SERIES
Discharge: HOME | End: 2024-10-28
Payer: MEDICAID

## 2024-10-25 PROCEDURE — 97140 MANUAL THERAPY 1/> REGIONS: CPT

## 2024-10-25 NOTE — PROGRESS NOTES
Jeferson Colvin  : 1990  Primary: [employer]* (Medicaid Managed)  Secondary:  [facility] @ [facility]  [address] [place] [address]  Phone: [phone]  Fax: [phone]    Plan of Care/Certification Expiration Date:  Certification Period Expiration Date: 24      Frequency/Duration: Plan Frequency: twice a week up to 90 days      Time In/Out:   Time In: 0900  Time Out: 1000    OT Visit Info:  Plan Frequency: twice a week up to 90 days  Total # of Visits Approved: 75  Total # of Visits to Date: 23  Progress Note Counter: 3          OUTPATIENT OCCUPATIONAL THERAPY: Treatment Note 10/25/2024  Episode: (Lipedema)         Treatment Diagnosis:    Lymphedema  Medical/Referring Diagnosis:   Localized edema   Referring Physician:  Jeferson Kidd PA-C MD Orders:  OT Eval and Treat   Return MD Appt:  TBD   Date of Onset:  Onset Date: 90     Allergies:  Patient has no allergy information on record.  Restrictions/Precautions:   None      Medications Last Reviewed:  10/25/2024     Interventions Planned (Treatment may consist of any combination of the following):     See Assessment Note      Subjective Comments:   Pt had to cancel Monday's appointment.   >Initial Pain Level:      2/10  >Post Session Pain Level:      2/10  Medications Last Reviewed:  10/25/2024  Updated Objective Findings:  None Today  Treatment   MANUAL THERAPY: ( 60 minutes):   Manual lymphatic drainage was utilized and necessary because of the patient's  lipolymphedema .   Manual Lymph Drainage:   Lymph Nodes:    Cervical Supraclavicular Axillary Abdominal Inguinal Popliteal Antecubital   RIGHT [x]     [x]     [x]     []     [x]     [x]     []       LEFT [x]     [x]     [x]     []     [x]     [x]     []         Anastamoses:   Axillo-axillary Inguino-inguinal Axillo-inguinal Inguino-axillary   ANTERIOR []     []     []     []       POSTERIOR []     []     []     []

## 2024-10-30 ENCOUNTER — HOSPITAL ENCOUNTER (OUTPATIENT)
Dept: PHYSICAL THERAPY | Age: 34
Setting detail: RECURRING SERIES
Discharge: HOME | End: 2024-11-02
Payer: MEDICAID

## 2024-10-30 PROCEDURE — 97140 MANUAL THERAPY 1/> REGIONS: CPT

## 2024-10-30 NOTE — PROGRESS NOTES
Patient/caregiver will be independent donning and doffing bilateral lower extremity compression garment.-GOAL MET 8/19/2024, 9/26/24     4. Patient will report reduced pain and tenderness with light touch bilateral lower extremities. -progressing 8/19/2024, 9/26/24    Re- certification 9/26/24: Patient working towards night time bandaging as she is already doing well with daily compression as she can only tolerate reduction wraps for 24 hours. Ordering night garments and recommending MEDI Lower leg Profile.     Progress note 10/17/2024: Patient wearing daily compression. Patient also working and doing well with weight loss. Continues to improve HEP. Has started Night garment reduction using bandages.     Treatment/Session Summary:    Treatment Assessment: Continue tx plan. Did not bandage this date as she is improving her HEP. MLD showing good response still. Patient now using home pump as well.   Communication/Consultation:  None today  Equipment provided today:  None  Recommendations/Intent for next treatment session: Next visit will focus on phase 1 complete decongestive therapy and patient education.    >Total Treatment Billable Duration:  OT Individual Minutes  Time In: 0900  Time Out: 1000  Minutes: 60    OZ Andujar/L, CLT     Charge Capture   Events  [employer] Portal  Appt Desk  Attendance Report     Future Appointments   Date Time Provider Department Center   11/4/2024  9:00 AM Leandro Alegre OT [facility]   11/6/2024  9:00 AM Leandro Alegre OT [facility]   11/12/2024  9:00 AM Leandro Alegre OT [facility]   11/14/2024  9:00 AM Leandro Alegre OT [facility]   11/19/2024  9:00 AM Leandro Alegre OT [facility]   11/21/2024  9:00 AM Leandro Alegre OT [facility]   11/25/2024  9:00 AM Chely Rogers, OT [facility]

## 2024-11-01 ENCOUNTER — HOSPITAL ENCOUNTER (OUTPATIENT)
Dept: PHYSICAL THERAPY | Age: 34
Setting detail: RECURRING SERIES
Discharge: HOME | End: 2024-11-04
Payer: MEDICAID

## 2024-11-01 PROCEDURE — 97140 MANUAL THERAPY 1/> REGIONS: CPT

## 2024-11-01 NOTE — PROGRESS NOTES
Jeferson Colvin  : 1990  Primary: [employer]* (Medicaid Managed)  Secondary:  [facility] @ [facility]  [address] [place] [address]  Phone: [phone]  Fax: [phone]    Plan of Care/Certification Expiration Date:  Certification Period Expiration Date: 24      Frequency/Duration: Plan Frequency: twice a week up to 90 days      Time In/Out:   Time In: 0900  Time Out: 1000    OT Visit Info:  Plan Frequency: twice a week up to 90 days  Total # of Visits Approved: 75  Total # of Visits to Date: 25  Progress Note Counter: 5          OUTPATIENT OCCUPATIONAL THERAPY: Treatment Note 2024  Episode: (Lipedema)         Treatment Diagnosis:    Lymphedema  Medical/Referring Diagnosis:   Localized edema   Referring Physician:  Jeefrson Kidd PA-C MD Orders:  OT Eval and Treat   Return MD Appt:  TBD   Date of Onset:  Onset Date: 90     Allergies:  Patient has no allergy information on record.  Restrictions/Precautions:   None      Medications Last Reviewed:  2024     Interventions Planned (Treatment may consist of any combination of the following):     See Assessment Note      Subjective Comments:   Pt had to cancel Monday's appointment.   >Initial Pain Level:      2/10  >Post Session Pain Level:      2/10  Medications Last Reviewed:  2024  Updated Objective Findings:  None Today  Treatment   MANUAL THERAPY: ( 60 minutes):   Manual lymphatic drainage was utilized and necessary because of the patient's  lipolymphedema .   Manual Lymph Drainage:   Lymph Nodes:    Cervical Supraclavicular Axillary Abdominal Inguinal Popliteal Antecubital   RIGHT [x]     [x]     [x]     []     [x]     [x]     []       LEFT [x]     [x]     [x]     []     [x]     [x]     []         Anastamoses:   Axillo-axillary Inguino-inguinal Axillo-inguinal Inguino-axillary   ANTERIOR []     []     []     []       POSTERIOR []     []     []     []

## 2024-11-04 ENCOUNTER — HOSPITAL ENCOUNTER (OUTPATIENT)
Dept: PHYSICAL THERAPY | Age: 34
Setting detail: RECURRING SERIES
Discharge: HOME | End: 2024-11-07
Payer: MEDICAID

## 2024-11-04 PROCEDURE — 97535 SELF CARE MNGMENT TRAINING: CPT

## 2024-11-04 PROCEDURE — 97140 MANUAL THERAPY 1/> REGIONS: CPT

## 2024-11-04 NOTE — PROGRESS NOTES
Jeferson Colvin  : 1990  Primary: [employer]* (Medicaid Managed)  Secondary:  [facility] @ [facility]  [address] [place] [address]  Phone: [phone]  Fax: [phone]    Plan of Care/Certification Expiration Date:  Certification Period Expiration Date: 24      Frequency/Duration: Plan Frequency: twice a week up to 90 days      Time In/Out:   Time In: 0900  Time Out: 1000    OT Visit Info:  Plan Frequency: twice a week up to 90 days  Total # of Visits Approved: 75  Total # of Visits to Date: 26  Progress Note Counter: 6          OUTPATIENT OCCUPATIONAL THERAPY: Treatment Note 2024  Episode: (Lipedema)         Treatment Diagnosis:    Lymphedema  Medical/Referring Diagnosis:   Localized edema   Referring Physician:  Jeferson Kidd PA-C MD Orders:  OT Eval and Treat   Return MD Appt:  TBD   Date of Onset:  Onset Date: 90     Allergies:  Patient has no allergy information on record.  Restrictions/Precautions:   None      Medications Last Reviewed:  2024     Interventions Planned (Treatment may consist of any combination of the following):     See Assessment Note      Subjective Comments:   Pt had to cancel Monday's appointment.   >Initial Pain Level:      2/10  >Post Session Pain Level:      2/10  Medications Last Reviewed:  2024  Updated Objective Findings:  None Today  Treatment   MANUAL THERAPY: ( 45 minutes):   Manual lymphatic drainage was utilized and necessary because of the patient's  lipolymphedema .   Manual Lymph Drainage:   Lymph Nodes:    Cervical Supraclavicular Axillary Abdominal Inguinal Popliteal Antecubital   RIGHT [x]     [x]     [x]     []     [x]     [x]     []       LEFT [x]     [x]     [x]     []     [x]     [x]     []         Anastamoses:   Axillo-axillary Inguino-inguinal Axillo-inguinal Inguino-axillary   ANTERIOR []     []     []     []       POSTERIOR []     []     []     []

## 2024-11-06 ENCOUNTER — HOSPITAL ENCOUNTER (OUTPATIENT)
Dept: PHYSICAL THERAPY | Age: 34
Setting detail: RECURRING SERIES
Discharge: HOME | End: 2024-11-09
Payer: MEDICAID

## 2024-11-06 PROCEDURE — 97140 MANUAL THERAPY 1/> REGIONS: CPT

## 2024-11-06 NOTE — PROGRESS NOTES
Jeferson Colvin  : 1990  Primary: [employer]* (Medicaid Managed)  Secondary:  [facility] @ [facility]  [address] [place] [address]  Phone: [phone]  Fax: [phone]    Plan of Care/Certification Expiration Date:  Certification Period Expiration Date: 24      Frequency/Duration: Plan Frequency: twice a week up to 90 days      Time In/Out:   Time In: 0900  Time Out: 1000    OT Visit Info:  Plan Frequency: twice a week up to 90 days  Total # of Visits Approved: 75  Total # of Visits to Date: 26  Progress Note Counter: 6          OUTPATIENT OCCUPATIONAL THERAPY: Treatment Note 2024  Episode: (Lipedema)         Treatment Diagnosis:    Lymphedema  Medical/Referring Diagnosis:   Localized edema   Referring Physician:  Jeferson Kidd PA-C MD Orders:  OT Eval and Treat   Return MD Appt:  TBD   Date of Onset:  Onset Date: 90     Allergies:  Patient has no allergy information on record.  Restrictions/Precautions:   None      Medications Last Reviewed:  2024     Interventions Planned (Treatment may consist of any combination of the following):     See Assessment Note      Subjective Comments:   Pt had to cancel Monday's appointment.   >Initial Pain Level:      2/10  >Post Session Pain Level:      2/10  Medications Last Reviewed:  2024  Updated Objective Findings:  None Today  Treatment   MANUAL THERAPY: ( 60 minutes):   Manual lymphatic drainage was utilized and necessary because of the patient's  lipolymphedema .   Manual Lymph Drainage:   Lymph Nodes:    Cervical Supraclavicular Axillary Abdominal Inguinal Popliteal Antecubital   RIGHT [x]     [x]     [x]     []     [x]     [x]     []       LEFT [x]     [x]     [x]     []     [x]     [x]     []         Anastamoses:   Axillo-axillary Inguino-inguinal Axillo-inguinal Inguino-axillary   ANTERIOR []     []     []     []       POSTERIOR []     []     []     []

## 2024-11-12 ENCOUNTER — APPOINTMENT (OUTPATIENT)
Dept: PHYSICAL THERAPY | Age: 34
End: 2024-11-12
Payer: MEDICAID

## 2024-11-14 ENCOUNTER — HOSPITAL ENCOUNTER (OUTPATIENT)
Dept: PHYSICAL THERAPY | Age: 34
Setting detail: RECURRING SERIES
Discharge: HOME | End: 2024-11-17
Payer: MEDICAID

## 2024-11-14 PROCEDURE — 97140 MANUAL THERAPY 1/> REGIONS: CPT

## 2024-11-14 NOTE — PROGRESS NOTES
RIGHT []     []     []     []       LEFT []     []     []     []         Limbs:   []    RUE     []    LUE     [x]    RLE    [x]    LLE    SELF CARE: (  0 minutes):   Reduction wrap using cotton liner. 3 bandages and rosidal foam this date.     Edema/Girth   PRETREATMENT AFFECTED LIMB(s): LOWER EXTREMITY       Date:   7/2/24 7/25  10/25/24   11/6/24 11/14/24     Right / Left               Groin    []      []               8 inches    []      []        56  56 56  56      4 inches    [x]      [x] 55.5/55  54  53  54?  53?      PoplitealSpace    [x]      [x] 48/47.5  43  45  41  41  40.5  41 40  40      8 inches    [x]      [x] 53/54  52  53  48  49  48  48 46  47      4 inches    [x]      [x] 38/41  42  41  38.5  40  39  40 37  39   30 down    [x]      [x]  31  30.5 29  30.5 29  29 28  30      Ankle    [x]      [x] 28/27  26  26.5  25.5  26  26  26 25.25  25.5      Instep    [x]      [x] 23.5/23.5  23.5  22  23  23  23  22.5 22.5  22.5   Measurements are taken in centimeters:  2.54 cm = 1 inch   Cumulative circumferential volumetric graph measurements  RLE total size 236 cm  219.5       LLE total size 248 cm  240.5            Goals: (Goals have been discussed and agreed upon with patient.)  Short-Term Functional Goals: Time Frame: 45 days  1. The patient/caregiver will verbalize understanding of lymphedema precautions. -GOAL MET 8/19/2024   2. Patient will be independent with skin care regimen to decrease risk of cellulitis. . -GOAL MET 8/19/2024   3. The patient/caregiver will be independent with self-manual lymph drainage techniques and show decrease in limb volume.  -progressing 8/19/2024  4. Patient will be independent in lymphatic exercises.    -progressing 8/19/2024  Discharge Goals: Time Frame: 45 days  1. Patient's bilateral lower extremity circumferential measurements will decrease 2 cm or more on volumetric graph to maximize functional use in ADL's.  -GOAL MET 8/19/2024 progress to 8+cms   2. The

## 2024-11-19 ENCOUNTER — HOSPITAL ENCOUNTER (OUTPATIENT)
Dept: PHYSICAL THERAPY | Age: 34
Setting detail: RECURRING SERIES
Discharge: HOME | End: 2024-11-22
Payer: MEDICAID

## 2024-11-19 PROCEDURE — 97140 MANUAL THERAPY 1/> REGIONS: CPT

## 2024-11-19 NOTE — PROGRESS NOTES
Jeferson Colvin  : 1990  Primary: [employer] (Medicaid Managed)  Secondary:  [facility]  [address] [place] [place] [address]  Phone: [phone]  Fax: [phone]    Plan of Care/Certification Expiration Date:  Certification Period Expiration Date: 24      Frequency/Duration: Plan Frequency: twice a week up to 90 days      Time In/Out:   Time In: 0900  Time Out: 1000    OT Visit Info:  Plan Frequency: twice a week up to 90 days  Total # of Visits Approved: 75  Total # of Visits to Date:   Progress Note Counter: 9          OUTPATIENT OCCUPATIONAL THERAPY: Treatment Note 2024  Episode: (Lipedema)         Treatment Diagnosis:    Lymphedema  Medical/Referring Diagnosis:   Localized edema   Referring Physician:  Jeferson Kidd PA-C MD Orders:  OT Eval and Treat   Return MD Appt:  TBD   Date of Onset:  Onset Date: 90     Allergies:  Patient has no allergy information on record.  Restrictions/Precautions:   None      Medications Last Reviewed:  2024     Interventions Planned (Treatment may consist of any combination of the following):     See Assessment Note      Subjective Comments:   \"I feel I am doing good with progress.\"  >Initial Pain Level:      2/10  >Post Session Pain Level:      2/10  Medications Last Reviewed:  2024  Updated Objective Findings:  None Today  Treatment   MANUAL THERAPY: ( 60 minutes):   Manual lymphatic drainage was utilized and necessary because of the patient's  lipolymphedema .   Manual Lymph Drainage:   Lymph Nodes:    Cervical Supraclavicular Axillary Abdominal Inguinal Popliteal Antecubital   RIGHT [x]     [x]     [x]     []     [x]     [x]     []       LEFT [x]     [x]     [x]     []     [x]     [x]     []         Anastamoses:   Axillo-axillary Inguino-inguinal Axillo-inguinal Inguino-axillary   ANTERIOR []     []     []     []       POSTERIOR []     []     []     []

## 2024-11-21 ENCOUNTER — HOSPITAL ENCOUNTER (OUTPATIENT)
Dept: PHYSICAL THERAPY | Age: 34
Setting detail: RECURRING SERIES
Discharge: HOME | End: 2024-11-24
Payer: MEDICAID

## 2024-11-21 PROCEDURE — 97140 MANUAL THERAPY 1/> REGIONS: CPT

## 2024-11-21 NOTE — PROGRESS NOTES
patient/caregiver will be independent with home management of lymphedema/lipedema.   -progressing 8/19/2024, 9/26/24, 11/21/2024  3. Patient/caregiver will be independent donning and doffing bilateral lower extremity compression garment.-GOAL MET 8/19/2024, 9/26/24, 11/21/2024     4. Patient will report reduced pain and tenderness with light touch bilateral lower extremities. -progressing 8/19/2024, 9/26/24, 11/21/2024    Re- certification 9/26/24: Patient working towards night time bandaging as she is already doing well with daily compression as she can only tolerate reduction wraps for 24 hours. Ordering night garments and recommending MEDI Lower leg Profile.     Progress note: 11/21/2024: Patient wearing daily compression. Patient also working and doing well with weight loss. Continues to improve HEP. Has started Night time Lower leg profile garment.     Treatment/Session Summary:    Treatment Assessment: Continue tx plan. Patient progressing towards discharge.    Communication/Consultation:  None today  Equipment provided today:  None  Recommendations/Intent for next treatment session: Next visit will focus on phase 1 complete decongestive therapy and patient education.    >Total Treatment Billable Duration:  OT Individual Minutes  Time In: 0900  Time Out: 1000  Minutes: 60    Leandro Alegre OTR/L, CLT     Charge Capture   Events  [facility] Portal  Appt Desk  Attendance Report     Future Appointments   Date Time Provider Department Center   11/25/2024  9:00 AM Chely Rogers, OT [facility]

## 2024-11-25 ENCOUNTER — HOSPITAL ENCOUNTER (OUTPATIENT)
Dept: PHYSICAL THERAPY | Age: 34
Setting detail: RECURRING SERIES
Discharge: HOME | End: 2024-11-28
Payer: MEDICAID

## 2024-11-25 PROCEDURE — 97140 MANUAL THERAPY 1/> REGIONS: CPT

## 2024-11-25 NOTE — PROGRESS NOTES
Jeferson Colvin  : 1990  Primary: [employer] (Medicaid Managed)  Secondary:  [facility]  [address] [place] [address]  Phone: [phone]  Fax: [phone]    Plan of Care/Certification Expiration Date:  Certification Period Expiration Date: 24      Frequency/Duration: Plan Frequency: twice a week up to 90 days      Time In/Out:   Time In: 0900  Time Out: 1000    OT Visit Info:  Plan Frequency: twice a week up to 90 days  Total # of Visits Approved: 75  Total # of Visits to Date:   Progress Note Counter: 2          OUTPATIENT OCCUPATIONAL THERAPY:  Treatment Note 2024  Episode: (Lipedema)         Treatment Diagnosis:    Lymphedema  Medical/Referring Diagnosis:   Localized edema   Referring Physician:  Jeferson Kidd PA-C MD Orders:  OT Eval and Treat   Return MD Appt:  TBD   Date of Onset:  Onset Date: 90     Allergies:  Patient has no allergy information on record.  Restrictions/Precautions:   None      Medications Last Reviewed:  2024     Interventions Planned (Treatment may consist of any combination of the following):     See Assessment Note      Subjective Comments:   No new complaints  >Initial Pain Level:      2/10  >Post Session Pain Level:      2/10  Medications Last Reviewed:  2024  Updated Objective Findings:  None Today  Treatment   MANUAL THERAPY: ( 60 minutes):   Manual lymphatic drainage was utilized and necessary because of the patient's  lipolymphedema .   Manual Lymph Drainage:   Lymph Nodes:    Cervical Supraclavicular Axillary Abdominal Inguinal Popliteal Antecubital   RIGHT [x]     [x]     [x]     []     [x]     [x]     []       LEFT [x]     [x]     [x]     []     [x]     [x]     []         Anastamoses:   Axillo-axillary Inguino-inguinal Axillo-inguinal Inguino-axillary   ANTERIOR []     []     []     []       POSTERIOR []     []     []     []       RIGHT []     []